# Patient Record
Sex: FEMALE | Race: WHITE | NOT HISPANIC OR LATINO | Employment: OTHER | URBAN - METROPOLITAN AREA
[De-identification: names, ages, dates, MRNs, and addresses within clinical notes are randomized per-mention and may not be internally consistent; named-entity substitution may affect disease eponyms.]

---

## 2023-06-29 ENCOUNTER — OFFICE VISIT (OUTPATIENT)
Dept: PRIMARY CARE | Facility: CLINIC | Age: 83
End: 2023-06-29
Payer: MEDICARE

## 2023-06-29 VITALS
DIASTOLIC BLOOD PRESSURE: 74 MMHG | HEIGHT: 59 IN | BODY MASS INDEX: 23.95 KG/M2 | SYSTOLIC BLOOD PRESSURE: 120 MMHG | WEIGHT: 118.8 LBS | TEMPERATURE: 97.3 F

## 2023-06-29 DIAGNOSIS — I10 PRIMARY HYPERTENSION: ICD-10-CM

## 2023-06-29 DIAGNOSIS — M54.50 CHRONIC MIDLINE LOW BACK PAIN WITHOUT SCIATICA: ICD-10-CM

## 2023-06-29 DIAGNOSIS — E78.5 HYPERLIPIDEMIA, UNSPECIFIED HYPERLIPIDEMIA TYPE: ICD-10-CM

## 2023-06-29 DIAGNOSIS — Z00.00 ENCOUNTER FOR ROUTINE HISTORY AND PHYSICAL EXAMINATION OF ADULT: ICD-10-CM

## 2023-06-29 DIAGNOSIS — R35.0 FREQUENCY OF URINATION: ICD-10-CM

## 2023-06-29 DIAGNOSIS — E55.9 VITAMIN D DEFICIENCY: ICD-10-CM

## 2023-06-29 DIAGNOSIS — K21.9 GASTROESOPHAGEAL REFLUX DISEASE WITHOUT ESOPHAGITIS: ICD-10-CM

## 2023-06-29 DIAGNOSIS — E28.39 MENOPAUSE OVARIAN FAILURE: ICD-10-CM

## 2023-06-29 DIAGNOSIS — M15.3 OTHER SECONDARY OSTEOARTHRITIS OF MULTIPLE SITES: ICD-10-CM

## 2023-06-29 DIAGNOSIS — I25.10 CORONARY ARTERIOSCLEROSIS: ICD-10-CM

## 2023-06-29 DIAGNOSIS — F51.01 PRIMARY INSOMNIA: ICD-10-CM

## 2023-06-29 DIAGNOSIS — G89.29 CHRONIC MIDLINE LOW BACK PAIN WITHOUT SCIATICA: ICD-10-CM

## 2023-06-29 DIAGNOSIS — F41.9 ANXIETY: Primary | ICD-10-CM

## 2023-06-29 DIAGNOSIS — R73.9 HYPERGLYCEMIA: ICD-10-CM

## 2023-06-29 LAB
POC HDL CHOLESTEROL: 71 MG/DL (ref 0–50)
POC LDL CHOLESTEROL: 56 MG/DL (ref 0–100)
POC NON-HDL CHOLESTEROL: 84 MG/DL (ref 0–130)
POC TOTAL CHOLESTEROL/HDL RATIO: 2.2 (ref 0–4.5)
POC TOTAL CHOLESTEROL: 154 MG/DL (ref 0–199)
POC TRIGLYCERIDES: 136 MG/DL (ref 0–150)

## 2023-06-29 PROCEDURE — 3074F SYST BP LT 130 MM HG: CPT | Performed by: FAMILY MEDICINE

## 2023-06-29 PROCEDURE — 80061 LIPID PANEL: CPT | Performed by: FAMILY MEDICINE

## 2023-06-29 PROCEDURE — 1036F TOBACCO NON-USER: CPT | Performed by: FAMILY MEDICINE

## 2023-06-29 PROCEDURE — 99215 OFFICE O/P EST HI 40 MIN: CPT | Performed by: FAMILY MEDICINE

## 2023-06-29 PROCEDURE — 3078F DIAST BP <80 MM HG: CPT | Performed by: FAMILY MEDICINE

## 2023-06-29 PROCEDURE — 1159F MED LIST DOCD IN RCRD: CPT | Performed by: FAMILY MEDICINE

## 2023-06-29 RX ORDER — LOSARTAN POTASSIUM AND HYDROCHLOROTHIAZIDE 12.5; 1 MG/1; MG/1
1 TABLET ORAL DAILY
Qty: 90 TABLET | Refills: 3 | Status: SHIPPED | OUTPATIENT
Start: 2023-06-29

## 2023-06-29 RX ORDER — HYDROCHLOROTHIAZIDE 50 MG/1
50 TABLET ORAL DAILY
Qty: 90 TABLET | Refills: 3 | Status: SHIPPED | OUTPATIENT
Start: 2023-06-29

## 2023-06-29 RX ORDER — ESTRADIOL 0.5 MG/1
0.5 TABLET ORAL DAILY
Qty: 90 TABLET | Refills: 3 | Status: SHIPPED | OUTPATIENT
Start: 2023-06-29 | End: 2024-06-23

## 2023-06-29 RX ORDER — LOSARTAN POTASSIUM AND HYDROCHLOROTHIAZIDE 12.5; 1 MG/1; MG/1
1 TABLET ORAL DAILY
COMMUNITY
End: 2023-06-29 | Stop reason: SDUPTHER

## 2023-06-29 RX ORDER — EZETIMIBE 10 MG/1
10 TABLET ORAL
COMMUNITY
Start: 2019-07-24 | End: 2023-06-29 | Stop reason: SDUPTHER

## 2023-06-29 RX ORDER — HYDROCHLOROTHIAZIDE 50 MG/1
50 TABLET ORAL DAILY
COMMUNITY
End: 2023-06-29 | Stop reason: SDUPTHER

## 2023-06-29 RX ORDER — EZETIMIBE 10 MG/1
10 TABLET ORAL
Qty: 90 TABLET | Refills: 3 | Status: SHIPPED | OUTPATIENT
Start: 2023-06-29

## 2023-06-29 RX ORDER — METOPROLOL SUCCINATE 25 MG/1
37.5 TABLET, EXTENDED RELEASE ORAL DAILY
Qty: 135 TABLET | Refills: 3 | Status: SHIPPED | OUTPATIENT
Start: 2023-06-29

## 2023-06-29 RX ORDER — SIMVASTATIN 40 MG/1
40 TABLET, FILM COATED ORAL DAILY
Qty: 90 TABLET | Refills: 3 | Status: SHIPPED | OUTPATIENT
Start: 2023-06-29

## 2023-06-29 RX ORDER — METOPROLOL SUCCINATE 25 MG/1
37.5 TABLET, EXTENDED RELEASE ORAL DAILY
COMMUNITY
End: 2023-06-29 | Stop reason: SDUPTHER

## 2023-06-29 RX ORDER — SIMVASTATIN 40 MG/1
40 TABLET, FILM COATED ORAL DAILY
COMMUNITY
End: 2023-06-29 | Stop reason: SDUPTHER

## 2023-06-29 RX ORDER — VIT C/E/ZN/COPPR/LUTEIN/ZEAXAN 250MG-90MG
CAPSULE ORAL
COMMUNITY

## 2023-06-29 RX ORDER — NAPROXEN SODIUM 220 MG/1
81 TABLET, FILM COATED ORAL DAILY
COMMUNITY

## 2023-06-29 RX ORDER — ESTRADIOL 0.5 MG/1
0.5 TABLET ORAL DAILY
COMMUNITY
End: 2023-06-29 | Stop reason: SDUPTHER

## 2023-06-29 RX ORDER — POTASSIUM CHLORIDE 750 MG/1
10 CAPSULE, EXTENDED RELEASE ORAL DAILY
Qty: 90 CAPSULE | Refills: 3 | Status: SHIPPED | OUTPATIENT
Start: 2023-06-29

## 2023-06-29 RX ORDER — POTASSIUM CHLORIDE 750 MG/1
10 CAPSULE, EXTENDED RELEASE ORAL DAILY
COMMUNITY
End: 2023-06-29 | Stop reason: SDUPTHER

## 2023-06-29 ASSESSMENT — ENCOUNTER SYMPTOMS
ARTHRALGIAS: 0
PALPITATIONS: 0
DIZZINESS: 0
DECREASED CONCENTRATION: 0
ANAL BLEEDING: 0
SEIZURES: 0
FREQUENCY: 0
NECK STIFFNESS: 0
HEADACHES: 0
BRUISES/BLEEDS EASILY: 0
WHEEZING: 0
SPEECH DIFFICULTY: 0
EYE ITCHING: 0
TROUBLE SWALLOWING: 0
APPETITE CHANGE: 0
FATIGUE: 0
HEMATURIA: 0
DIARRHEA: 0
CHILLS: 0
CONSTIPATION: 0
EYE DISCHARGE: 0
ADENOPATHY: 0
SLEEP DISTURBANCE: 0
TREMORS: 0
SINUS PRESSURE: 0
NECK PAIN: 0
VOICE CHANGE: 0
UNEXPECTED WEIGHT CHANGE: 0
ABDOMINAL DISTENTION: 0
SHORTNESS OF BREATH: 0
NUMBNESS: 0
DIAPHORESIS: 0
FEVER: 0
EYE PAIN: 0
BACK PAIN: 0
PHOTOPHOBIA: 0
CHEST TIGHTNESS: 0
SORE THROAT: 0
COUGH: 0
NAUSEA: 0
WEAKNESS: 0
EYE REDNESS: 0
VOMITING: 0
BLOOD IN STOOL: 0
NERVOUS/ANXIOUS: 0
CONFUSION: 0
ABDOMINAL PAIN: 0
AGITATION: 0

## 2023-06-29 ASSESSMENT — PATIENT HEALTH QUESTIONNAIRE - PHQ9
1. LITTLE INTEREST OR PLEASURE IN DOING THINGS: NOT AT ALL
SUM OF ALL RESPONSES TO PHQ9 QUESTIONS 1 AND 2: 0
2. FEELING DOWN, DEPRESSED OR HOPELESS: NOT AT ALL

## 2023-06-29 NOTE — PROGRESS NOTES
Subjective   Patient ID: Bela Lee is a 83 y.o. female who presents for No chief complaint on file..  Follow-up and refills on medication.  She is not due for her Medicare wellness exam until September    #1 Health Maintenance:  DTaP 10/27/2009, March 2021  Pneumovax 10/27/2008  Prevnar 13 5/20/2015  Maternal #1 January 22, 2021, #2 February 19, 2021  Mammogram yearly with Dr. Serrano, 2015, they informed her she may not get the next 1 pain for, she will pay for it out of pocket if necessary, at 1/20/16 normal, 8/2/2017 [facility] through Dr. Serrano normal, completed in 2018 and will complete in 2019 through Dr. Serrano, October 11, 2020 mammogram normal  DEXA scan 5/17/2011 in 5/14/2014 spine 0.9 total hip 1.4 femoral neck 0.6, 7/10/2017 spine +1.5 femoral neck -0.7 hip +0.7, will recheck July 2019, recheck after July 5, 2019 July 22, 2019 spine 1.1 (-3.9%), hip 1.0 (+4.3%), femoral neck 0.6 (-18.6%), next DEXA due summer 2022  Zostavax completed, Shingrix written 7/24/2018 7/18/2018 plus TSH 1.11, July 2019 pending, July 22 19 CMP plus CBC plus TSH  Colonoscopy 7/29/2008 Dr. Rubin 10 years, 7/24/2018 reminded to contact Dr. Rubin, 1/4/2019 still pending, did not find polyps no family history no symptoms will do Cologuard (July 22, 2020), company did not receive we will resubmit  September 19, 2022 hemoglobin A1c 5.2%    #2 Benign Essential Hypertension:  7/24/2018 blood pressure 130/80 weight 123 pounds  1/4/2019 blood pressure 130/80 weight 122 pounds  7/29/2019 blood pressure 122/70 weight 122 pounds  July 22, 2020 blood pressure 120/84 weight 122 pounds  July 22, 2021 blood pressure 130/80 weight 119 pounds  September 19, 2022 blood pressure 120/76 weight 120 pounds  June 29, 2023      #3 Hyperlipidemia:  12/26/2017 total 172 HDL 83 ratio 2.1 LDL 48 triglycerides 203  7/24/2018 total 168 HDL 75 ratio 2.2 LDL 65 triglycerides 139  1/4/2019 total 157 HDL 81 ratio 1.9 (LDL 27) triglycerides 245  7/29/2019  total 146 HDL 68 ratio 2.2 LDL 44 triglycerides 174  July 22, 2021 total 131 HDL 65 ratio 2 LDL 36 triglycerides 152  September 19, 2022 total 150 HDL 79 ratio 1.9 LDL 17 triglycerides 268  June 29, 2023 total 154 HDL 71 ratio 2.2 LDL 56 triglycerides 136    #4 Carotid Bruit ICD code 785.9:  She is getting this checked every 3 years through the [facility] through Dr. Jair Cole. She is due for a recheck next year (2016), due to have it rechecked in 2019.  The patient has an appointment on September 3 to see Dr. Cabrera at the [facility]. She is scheduled to get a stress echo and carotid ultrasound  1/4/2019 no bruit heard will be due later this year for recheck carotid ultrasound    #5 Diverticulosis:  She has had no symptoms referable to diverticulitis or diverticulosis although it was identified on her colonoscopy    #6 Postmenopausal HRT:  She sees Dr. Serrano for her hormone replacement therapy and she is getting mammograms regularly under her guidance.  She is due for mammogram and will put my name on the slip to review. They will no longer pay for Premarin and it is cost year $900 a year they have recommended estradiol.  We will try low-dose estradiol to see if it is less expensive for her.    September 19, 2022  This 82-year-old woman is here today for her Medicare wellness exam. She is also here to get her other medications renewed including a refill on alprazolam. She is taking the medication very infrequently and oftentimes only takes a small portion of a pill. She has some needed pills from her last prescription. She feels she will need less than 15 pills over the next 3 months.  She already received her flu shot this year on September 19, 2022  She is having trouble swallowing her potassium chloride. I noted she was on 37-1/2 mg of hydrochlorothiazide between the 25 mg hydrochlorothiazide and the 10 mEq and her blood pressure pill. I am having her stop the 25 mg hydrochlorothiazide because of  her hyponatremia and hypokalemia. Hopefully she will not have to continue to take the potassium chloride. I will plan on seeing her back at the end of June 2023.    July 22, 2021  I am sending her downstairs for a CMP CBC TSH and vitamin D. Her lipid profile was done upstairs. The patient does not need any more of her benzodiazepine at this time. She has to stay awake and alert because of her 's illness and she has not been able to take the medication.    Review of Systems   Constitutional:  Negative for appetite change, chills, diaphoresis, fatigue, fever and unexpected weight change.   HENT:  Negative for congestion, ear discharge, ear pain, hearing loss, nosebleeds, sinus pressure, sore throat, tinnitus, trouble swallowing and voice change.    Eyes:  Negative for photophobia, pain, discharge, redness, itching and visual disturbance.   Respiratory:  Negative for cough, chest tightness, shortness of breath and wheezing.    Cardiovascular:  Negative for chest pain, palpitations and leg swelling.   Gastrointestinal:  Negative for abdominal distention, abdominal pain, anal bleeding, blood in stool, constipation, diarrhea, nausea and vomiting.   Endocrine: Negative for polyuria.   Genitourinary:  Negative for frequency and hematuria.   Musculoskeletal:  Negative for arthralgias, back pain, neck pain and neck stiffness.   Skin:  Negative for rash.   Allergic/Immunologic: Negative for environmental allergies and food allergies.   Neurological:  Negative for dizziness, tremors, seizures, syncope, speech difficulty, weakness, numbness and headaches.   Hematological:  Negative for adenopathy. Does not bruise/bleed easily.   Psychiatric/Behavioral:  Negative for agitation, behavioral problems, confusion, decreased concentration, sleep disturbance and suicidal ideas. The patient is not nervous/anxious.        Objective   Physical Exam  Vitals and nursing note reviewed.   Constitutional:       Appearance: Normal  appearance. She is normal weight.   HENT:      Head: Normocephalic.   Eyes:      Extraocular Movements: Extraocular movements intact.      Conjunctiva/sclera: Conjunctivae normal.      Pupils: Pupils are equal, round, and reactive to light.   Neurological:      Mental Status: She is alert.         Assessment/Plan